# Patient Record
(demographics unavailable — no encounter records)

---

## 2024-10-20 NOTE — PHYSICAL EXAM
[General Appearance - Well Developed] : well developed [Normal Appearance] : normal appearance [Well Groomed] : well groomed [General Appearance - Well Nourished] : well nourished [No Deformities] : no deformities [General Appearance - In No Acute Distress] : no acute distress [Normal Conjunctiva] : the conjunctiva exhibited no abnormalities [Eyelids - No Xanthelasma] : the eyelids demonstrated no xanthelasmas [Normal Oral Mucosa] : normal oral mucosa [No Oral Pallor] : no oral pallor [No Oral Cyanosis] : no oral cyanosis [Normal Jugular Venous A Waves Present] : normal jugular venous A waves present [Normal Jugular Venous V Waves Present] : normal jugular venous V waves present [No Jugular Venous Celeste A Waves] : no jugular venous celeste A waves [Heart Rate And Rhythm] : heart rate and rhythm were normal [Heart Sounds] : normal S1 and S2 [Murmurs] : no murmurs present [Arterial Pulses Normal] : the arterial pulses were normal [Edema] : no peripheral edema present [Respiration, Rhythm And Depth] : normal respiratory rhythm and effort [Exaggerated Use Of Accessory Muscles For Inspiration] : no accessory muscle use [Auscultation Breath Sounds / Voice Sounds] : lungs were clear to auscultation bilaterally [Abdomen Soft] : soft [Abdomen Tenderness] : non-tender [Abdomen Mass (___ Cm)] : no abdominal mass palpated [Abnormal Walk] : normal gait [Gait - Sufficient For Exercise Testing] : the gait was sufficient for exercise testing [Nail Clubbing] : no clubbing of the fingernails [Cyanosis, Localized] : no localized cyanosis [Petechial Hemorrhages (___cm)] : no petechial hemorrhages [Oriented To Time, Place, And Person] : oriented to person, place, and time [] : no ischemic changes [Affect] : the affect was normal [Mood] : the mood was normal [No Anxiety] : not feeling anxious

## 2024-10-20 NOTE — PHYSICAL EXAM
[General Appearance - Well Developed] : well developed [Normal Appearance] : normal appearance [Well Groomed] : well groomed [General Appearance - Well Nourished] : well nourished [No Deformities] : no deformities [General Appearance - In No Acute Distress] : no acute distress [Normal Conjunctiva] : the conjunctiva exhibited no abnormalities [Eyelids - No Xanthelasma] : the eyelids demonstrated no xanthelasmas [Normal Oral Mucosa] : normal oral mucosa [No Oral Pallor] : no oral pallor [No Oral Cyanosis] : no oral cyanosis [Normal Jugular Venous A Waves Present] : normal jugular venous A waves present [Normal Jugular Venous V Waves Present] : normal jugular venous V waves present [No Jugular Venous Celeste A Waves] : no jugular venous celeste A waves [Heart Rate And Rhythm] : heart rate and rhythm were normal [Heart Sounds] : normal S1 and S2 [Murmurs] : no murmurs present [Arterial Pulses Normal] : the arterial pulses were normal [Edema] : no peripheral edema present [Respiration, Rhythm And Depth] : normal respiratory rhythm and effort [Exaggerated Use Of Accessory Muscles For Inspiration] : no accessory muscle use [Auscultation Breath Sounds / Voice Sounds] : lungs were clear to auscultation bilaterally [Abdomen Soft] : soft [Abdomen Tenderness] : non-tender [Abdomen Mass (___ Cm)] : no abdominal mass palpated [Abnormal Walk] : normal gait [Gait - Sufficient For Exercise Testing] : the gait was sufficient for exercise testing [Nail Clubbing] : no clubbing of the fingernails [Petechial Hemorrhages (___cm)] : no petechial hemorrhages [Cyanosis, Localized] : no localized cyanosis [] : no ischemic changes [Oriented To Time, Place, And Person] : oriented to person, place, and time [Affect] : the affect was normal [Mood] : the mood was normal [No Anxiety] : not feeling anxious

## 2024-10-20 NOTE — PHYSICAL EXAM
[General Appearance - Well Developed] : well developed [Normal Appearance] : normal appearance [Well Groomed] : well groomed [General Appearance - Well Nourished] : well nourished [No Deformities] : no deformities [General Appearance - In No Acute Distress] : no acute distress [Normal Conjunctiva] : the conjunctiva exhibited no abnormalities [Eyelids - No Xanthelasma] : the eyelids demonstrated no xanthelasmas [Normal Oral Mucosa] : normal oral mucosa [No Oral Pallor] : no oral pallor [No Oral Cyanosis] : no oral cyanosis [Normal Jugular Venous A Waves Present] : normal jugular venous A waves present [Normal Jugular Venous V Waves Present] : normal jugular venous V waves present [No Jugular Venous Celeste A Waves] : no jugular venous celeste A waves [Heart Rate And Rhythm] : heart rate and rhythm were normal [Heart Sounds] : normal S1 and S2 [Murmurs] : no murmurs present [Arterial Pulses Normal] : the arterial pulses were normal [Edema] : no peripheral edema present [Respiration, Rhythm And Depth] : normal respiratory rhythm and effort [Exaggerated Use Of Accessory Muscles For Inspiration] : no accessory muscle use [Auscultation Breath Sounds / Voice Sounds] : lungs were clear to auscultation bilaterally [Abdomen Soft] : soft [Abdomen Tenderness] : non-tender [Abdomen Mass (___ Cm)] : no abdominal mass palpated [Abnormal Walk] : normal gait [Gait - Sufficient For Exercise Testing] : the gait was sufficient for exercise testing [Nail Clubbing] : no clubbing of the fingernails [Cyanosis, Localized] : no localized cyanosis [Petechial Hemorrhages (___cm)] : no petechial hemorrhages [] : no ischemic changes [Oriented To Time, Place, And Person] : oriented to person, place, and time [Affect] : the affect was normal [Mood] : the mood was normal [No Anxiety] : not feeling anxious

## 2024-10-29 NOTE — REASON FOR VISIT
[FreeTextEntry1] : 65 year-old male with aortic aneurysm (4.0 cm), hyperglycemia, presents for followup.    Patient was last seen on 6/1/15 for evaluation of shortness of breath.  Patient reports no cardiac history. He is a friend recently  of MI in his 40s. Patient is concerned about his heart. He denies chest discomfort but reports shortness of breath with exertion. He denies palpitations. He reports no history of syncope.  Patient underwent a stress echo and completed 11 minutes of Keith protocol.  There were upsloping ST depressions on ECG but there was no echocardiographic evidence of ischemia.  Patient was noted to have mild aortic root dilatation (4.0 cm).

## 2024-10-29 NOTE — HISTORY OF PRESENT ILLNESS
[FreeTextEntry1] : 10/25/24 - Please refer to NP note below.  Pt is here for checkup. Pt reports SOB if he walks fast. Patient denies CP, palpitations, or lightheadedness. Patient denies h/o syncope. He has daily Qigong exercise for 30 minutes. He smokes 1/3 PPD.  Pt is not on meds.  Today's /82 P 69.  Exam unremarkable.  I advised patient to undergo an echocardiogram.  Patient should have a followup echo in 1 year.   6/1/15 - Patient reports no cardiac history. He is a friend recently  of MI in his 40s. Patient is concerned about his heart. He denies chest discomfort but reports shortness of breath with exertion. He denies palpitations. He reports no history of syncope.   Patient underwent a stress echo and completed 11 minutes of Keith protocol.  There were upsloping ST depressions on ECG but there was no echocardiographic evidence of ischemia.  Patient was noted to have mild aortic root dilatation (4.0 cm).

## 2025-02-20 NOTE — DISCUSSION/SUMMARY
[FreeTextEntry1] : Problem #1 ruptured infrarenal abdominal aortic aneurysm s/p aorto-aortic bypass graft concern for spillage due to post-operative colon ischemia and perforation necessitating a Cabezas's procedure no current evidence of graft infection, but suspected based on clinical course continue long-term antibiotics as per ID follow up with interventional radiology for drain removal follow up with Dr. Scooby Francisco for colectomy follow up

## 2025-02-20 NOTE — REASON FOR VISIT
[Spouse] : spouse [de-identified] : Aorto-aortic bypass for ruptured AAA [de-identified] : 12/31/24 [de-identified] : Poor appetite. No abdominal or back pain. Denies claudication or rest pain.

## 2025-03-24 NOTE — HISTORY OF PRESENT ILLNESS
[FreeTextEntry1] : doing well gaining wt  does have  back pain  no fever or other symptoms   wd healed   looks better  does have back pain  no decubitus in lumbar area

## 2025-03-24 NOTE — PHYSICAL EXAM
[General Appearance - Alert] : alert [General Appearance - In No Acute Distress] : in no acute distress [Outer Ear] : the ears and nose were normal in appearance [Oropharynx] : the oropharynx was normal with no thrush [] : no respiratory distress [Auscultation Breath Sounds / Voice Sounds] : lungs were clear to auscultation bilaterally

## 2025-03-24 NOTE — ASSESSMENT
[FreeTextEntry1] : S/p ruptured AA with; ischemic bowel and collections some concern about violation of graft a the time of surgery  complete months of ;IV ab and not o0n au;gmentum as major orgasm was [id number]   last ct in February wh9ch was reviewed and iZ discussed with surgery and vascular  drain out and no paln to drain residual collections seen on ct per surgery   continue  Augmentin  for  another moth \repeat CT in April

## 2025-06-26 NOTE — CONSULT LETTER
[Dear  ___] : Dear ~ABHIJEET, [Courtesy Letter:] : I had the pleasure of seeing your patient, [unfilled], in my office today. [Please see my note below.] : Please see my note below. [Consult Closing:] : Thank you very much for allowing me to participate in the care of this patient.  If you have any questions, please do not hesitate to contact me. [Sincerely,] : Sincerely, [FreeTextEntry2] : Dr. Scooby Francisco [FreeTextEntry3] : Shane Calles M.D., F.CATE.C.S., F.A.S.C.R.S. Chief Colorectal Clinical Services, [facility] [DrMikayla  ___] : Dr. OZUNA

## 2025-06-26 NOTE — PHYSICAL EXAM
[Normal Breath Sounds] : Normal breath sounds [Normal Heart Sounds] : normal heart sounds [Alert] : alert [Oriented to Person] : oriented to person [Oriented to Place] : oriented to place [Oriented to Time] : oriented to time [Calm] : calm [Abdomen Masses] : No abdominal masses [Abdomen Tenderness] : ~T No ~M abdominal tenderness [de-identified] : Healed midline wound.  Ostomy left upper quadrant normal. [de-identified] : WNL [de-identified] : WNL [de-identified] : DORITAL [de-identified] : WNL ROM [de-identified] : WNL

## 2025-06-26 NOTE — PHYSICAL EXAM
[Normal Breath Sounds] : Normal breath sounds [Normal Heart Sounds] : normal heart sounds [Alert] : alert [Oriented to Person] : oriented to person [Oriented to Place] : oriented to place [Oriented to Time] : oriented to time [Calm] : calm [Abdomen Masses] : No abdominal masses [Abdomen Tenderness] : ~T No ~M abdominal tenderness [de-identified] : Healed midline wound.  Ostomy left upper quadrant normal. [de-identified] : WNL [de-identified] : WNL [de-identified] : DORITAL [de-identified] : WNL ROM [de-identified] : WNL

## 2025-06-26 NOTE — CONSULT LETTER
[Dear  ___] : Dear ~ABHIJEET, [Courtesy Letter:] : I had the pleasure of seeing your patient, [unfilled], in my office today. [Please see my note below.] : Please see my note below. [Consult Closing:] : Thank you very much for allowing me to participate in the care of this patient.  If you have any questions, please do not hesitate to contact me. [Sincerely,] : Sincerely, [FreeTextEntry2] : Dr. Scooby Francisco [FreeTextEntry3] : Shane Calles M.D., F.CATE.C.S., F.A.S.C.R.S. Chief Colorectal Clinical Services, [facility] [DrMikayla  ___] : Dr. OZUNA

## 2025-06-26 NOTE — HISTORY OF PRESENT ILLNESS
[FreeTextEntry1] : Bola is a 66 y/o male here for a consultation visit, colostomy reversal.   Hospitalized on 12/31/24 - 2/26/25 due to ruptured AAA.   s/p Open repair of ruptured abdominal aortic aneurysm with 14mm dacron tube graft. Temporary abdominal closure with negative pressure wound vac therapy on 12/31/24 by Dr. Emmanuel Sweeney   s/p Damage control left hemicolectomy. Mobilization of splenic flexure. Bridging abdominal wall mesh placement for mesh mediated fascial traction. ABThera VAC placement on 1/3/25 by Dr. Scooby Francisco due to Ruptured abdominal aortic aneurysm and Acute mesenteric ischemia. Pathology - 1. Left colon, resection - Colon with transmural necrosis, acute inflammation, fibrinopurulent exudate, and acute serositis, consistent with ischemic colitis - Mesenteric thrombosis present  s/p Reopening of prior laparotomy, resection of transverse colon, ABThera VAC placement on 1/5/25 by Dr. Katerina De Jesus due to Ischemic colitis. Pathology - Transverse colon, resection: - Portion of colon with areas of transmural necrosis, acute inflammation; findings consistent with ischemic colitis - Acute serositis - Proximal resection margin shows ischemic changes and acute serositis - Distal resection margin appears viable - Multiple benign lymph nodes  s/p End transverse colostomy creation. Removal of the polypropylene mesh from the abdominal wall. Abdominal wall closure on 1/7/25 by Dr. Scooby Francisco due to Acute mesenteric ischemia.  Today pt reports no pain. Surgical incisions have healed. Denies nausea and vomiting. Denies fever and chills. Empties bag 1-2 times daily, output is soft, slight amount of mucus from rectum, sometimes has urge to have BM through rectum and would get leakage of yellow liquid, stoma is normal. Good appetite. Not taking anticoagulants.

## 2025-06-26 NOTE — ASSESSMENT
[FreeTextEntry1] : I have seen and evaluated the patient, and I have corroborated all nursing input into this note.  The patient is status post surgery for a ruptured aortic aneurysm.  He had an open abdomen.  He required a left colectomy for ischemia and now has a right transverse colostomy and a closed rectosigmoid junction.  He had multiple abdominal procedures and had a temporary bridging mesh that was eventually removed.  Currently he feels well and is interested in closure of his colostomy.  Since he has never had a colonoscopy I recommended that examination prior to colostomy closure.  Indications, risks, benefits, alternatives reviewed including but not limited to bleeding and perforation.  The patient has a cardiologist who he has not seen since his surgery.  I recommended follow-up with his cardiologist prior to the colonoscopy to ensure that there are no unaddressed issues.  After the colonoscopy colostomy closure can be arranged.  Unfortunately, open surgery will be needed because both the open abdominal aortic aneurysm repair and the open abdomen often result in extensive adhesions.  Indications risks, benefits, alternatives reviewed including but not limited to bleeding, infection, anastomotic leak, and alteration of bowel function.  The patient's family member was present for the conversation and all questions were answered.

## 2025-06-30 NOTE — END OF VISIT
[Time Spent: ___ minutes] : I have spent [unfilled] minutes of time on the encounter which excludes teaching and separately reported services. O-L Flap Text: The defect edges were debeveled with a #15 scalpel blade.  Given the location of the defect, shape of the defect and the proximity to free margins an O-L flap was deemed most appropriate.  Using a sterile surgical marker, an appropriate advancement flap was drawn incorporating the defect and placing the expected incisions within the relaxed skin tension lines where possible.    The area thus outlined was incised deep to adipose tissue with a #15 scalpel blade.  The skin margins were undermined to an appropriate distance in all directions utilizing iris scissors.

## 2025-07-06 NOTE — PHYSICAL EXAM
[Respiratory Effort] : normal respiratory effort [Normal Rate and Rhythm] : normal rate and rhythm [Ankle Swelling (On Exam)] : present [Ankle Swelling Bilaterally] : bilaterally  [Ankle Swelling On The Right] : mild [Varicose Veins Of Lower Extremities] : not present [] : not present [Abdomen Tenderness] : ~T ~M No abdominal tenderness [Skin Ulcer] : no ulcer [Alert] : alert [Oriented to Person] : oriented to person [Oriented to Place] : oriented to place [Oriented to Time] : oriented to time [Calm] : calm [de-identified] : appears stated age [de-identified] : normocephalic, atraumatic [de-identified] : supple [FreeTextEntry1] : well healed midline laparotomy

## 2025-07-06 NOTE — PHYSICAL EXAM
[Respiratory Effort] : normal respiratory effort [Normal Rate and Rhythm] : normal rate and rhythm [Ankle Swelling (On Exam)] : present [Ankle Swelling Bilaterally] : bilaterally  [Ankle Swelling On The Right] : mild [Varicose Veins Of Lower Extremities] : not present [] : not present [Abdomen Tenderness] : ~T ~M No abdominal tenderness [Skin Ulcer] : no ulcer [Alert] : alert [Oriented to Person] : oriented to person [Oriented to Place] : oriented to place [Oriented to Time] : oriented to time [Calm] : calm [de-identified] : appears stated age [de-identified] : normocephalic, atraumatic [de-identified] : supple [FreeTextEntry1] : well healed midline laparotomy

## 2025-07-06 NOTE — HISTORY OF PRESENT ILLNESS
[FreeTextEntry1] : 65-year-old man s/p aorto-aortic bypass for ruptured AAA on 12/31/24.  02/19/25 - Poor appetite. No abdominal or back pain. Denies claudication or rest pain. 05/19/25 - CTA Abd/Pelvis: Partially visualized aortic dissection extending from inferior descending thoracic aorta to proximal abdominal aorta with patent mesenteric and renal vessels. Substantial resolution periaortic collection with small residual collection. Status post [id number] aortic bypass graft repair ruptured aortic aneurysm without evidence of leak [de-identified] : States he is feeling well. Denies chest, abdominal, or back pain. States his appetite has returned. He is having normal bowel movements.

## 2025-07-06 NOTE — HISTORY OF PRESENT ILLNESS
[FreeTextEntry1] : 65-year-old man s/p aorto-aortic bypass for ruptured AAA on 12/31/24.  02/19/25 - Poor appetite. No abdominal or back pain. Denies claudication or rest pain. 05/19/25 - CTA Abd/Pelvis: Partially visualized aortic dissection extending from inferior descending thoracic aorta to proximal abdominal aorta with patent mesenteric and renal vessels. Substantial resolution periaortic collection with small residual collection. Status post [id number] aortic bypass graft repair ruptured aortic aneurysm without evidence of leak [de-identified] : States he is feeling well. Denies chest, abdominal, or back pain. States his appetite has returned. He is having normal bowel movements.

## 2025-07-06 NOTE — ASSESSMENT
[FreeTextEntry1] : Problem #1 ruptured abdominal aortic aneurysm s/p repair with prolonged hospitalization patient back to baseline recent CT demonstrated aortic dissection pt asymptomatic will obtain CTA chest, abdomen, and pelvis to assess full extent of dissection. discussed with patient. follow up as scheduled

## 2025-07-07 NOTE — REASON FOR VISIT
[FreeTextEntry1] : 65 year-old male with aortic aneurysm (4.4 cm), ruptured AAA s/p aorto-aortic bypass on 12/31/24, complicated by acute mesenteric ischemia requiring ex-lap, left hemicolectomy on 1/3/25, s/p end transverse colostomy creation and abdominal wall closure on 1/7/25, hyperglycemia, presents for followup.    Patient was last seen on 10/25/24 - > Pt reports SOB if he walks fast. Patient denies CP, palpitations, or lightheadedness. Patient denies h/o syncope. He has daily Qigong exercise for 30 minutes. He smokes 1/3 PPD. Pt is not on meds. Today's /82 P 69. Exam unremarkable. I advised patient to undergo an echocardiogram.  Patient underwent an echocardiogram and it showed normal LV function, mild aortic root dilatation (4.4 cm), mild dilatation of the ascending aorta (3.9 cm), without significant valvular pathology.  Patient should have a followup echo in 1 year.  CTA A/P 5/22/25 - Partially visualized aortic dissection extending from inferior descending thoracic aorta to proximal abdominal aorta with patent mesenteric and renal vessels.  Patient underwent a stress echo 6/1/15 and completed 11 minutes of Keith protocol.  There were upsloping ST depressions on ECG but there was no echocardiographic evidence of ischemia.  Patient was noted to have mild aortic root dilatation (4.0 cm).

## 2025-07-07 NOTE — HISTORY OF PRESENT ILLNESS
[FreeTextEntry1] : 25-  Please refer to NP note below. Pt needs a cardiac clearance prior to colonoscopy and colostomy closure. Patient denies CP, SOB, palpitations, or lightheadedness. Patient denies h/o syncope. Patient denies any bleeding issues. He walks daily for 15 minutes without symptoms. He is able to walk 3 flights of stairs. He quitted smoking since surgery.  Pt is on ASA 81mg PRN, Metoprolol Tartrate 50mg.  His home SBP is around 130. Today's /80P 60.  Exam unremarkable.  ECG showed no ischemic changes.  Patient underwent an echocardiogram and it showed normal LV function, mild aortic root dilatation (4.4 cm), mild dilatation of the ascending aorta (3.9 cm), without significant valvular pathology.  Patient underwent a treadmill stress test and completed 7 minutes of Keith protocol.  There was no ECG evidence of ischemia.  Following treadmill stress, there was no echocardiographic evidence of ischemia.  Patient is cleared for colonoscopy, surgery and anesthesia.  Patient may hold ASA 81 mg for 7 days prior to surgery/procedure.  (1) Cardiac clearance prior to colonoscopy and colostomy closure - Patient has been stable s/p ruptured AAA requiring open repair.  He also has a descending aortic dissection.  Patient denies exertional symptoms.  He has a normal ECG.  Patient underwent an echocardiogram and it showed normal LV function, mild aortic root dilatation (4.4 cm), mild dilatation of the ascending aorta (3.9 cm), without significant valvular pathology.  Patient underwent a treadmill stress test and completed 7 minutes of Keith protocol.  There was no ECG evidence of ischemia.  Following treadmill stress, there was no echocardiographic evidence of ischemia.  Patient is cleared for colonoscopy, surgery and anesthesia.  Patient may hold ASA 81 mg for 7 days prior to surgery/procedure.  (2) Ruptured AAA requiring open repair, descending aortic dissection - I advised patient to continue Metoprolol for BP and HR control.  I advised patient to followup with vascular surgery.  (3) Followup - 3 months.   10/25/24 - Please refer to NP note below.  Pt is here for checkup. Pt reports SOB if he walks fast. Patient denies CP, palpitations, or lightheadedness. Patient denies h/o syncope. He has daily Qigong exercise for 30 minutes. He smokes 1/3 PPD.  Pt is not on meds.  Today's /82 P 69.  Exam unremarkable.  I advised patient to undergo an echocardiogram.  Patient should have a followup echo in 1 year.   6/1/15 - Patient reports no cardiac history. He is a friend recently  of MI in his 40s. Patient is concerned about his heart. He denies chest discomfort but reports shortness of breath with exertion. He denies palpitations. He reports no history of syncope.   Patient underwent a stress echo and completed 11 minutes of Keith protocol.  There were upsloping ST depressions on ECG but there was no echocardiographic evidence of ischemia.  Patient was noted to have mild aortic root dilatation (4.0 cm).

## 2025-07-07 NOTE — HISTORY OF PRESENT ILLNESS
[FreeTextEntry1] : 25-  Please refer to NP note below. Pt needs a cardiac clearance prior to colonoscopy and colostomy closure. Patient denies CP, SOB, palpitations, or lightheadedness. Patient denies h/o syncope. Patient denies any bleeding issues. He walks daily for 15 minutes without symptoms. He is able to walk 3 flights of stairs. He quitted smoking since surgery.  Pt is on ASA 81mg PRN, Metoprolol Tartrate 50mg.  His home SBP is around 130. Today's /80P 60.  Exam unremarkable.  ECG showed no ischemic changes.  Patient underwent an echocardiogram and it showed normal LV function, mild aortic root dilatation (4.4 cm), mild dilatation of the ascending aorta (3.9 cm), without significant valvular pathology.  Patient underwent a treadmill stress test and completed 7 minutes of Keith protocol.  There was no ECG evidence of ischemia.  Following treadmill stress, there was no echocardiographic evidence of ischemia.  Patient is cleared for colonoscopy, surgery and anesthesia.  Patient may hold ASA 81 mg for 7 days prior to surgery/procedure.  (1) Cardiac clearance prior to colonoscopy and colostomy closure - Patient has been stable s/p ruptured AAA requiring open repair.  He also has a descending aortic dissection.  Patient denies exertional symptoms.  He has a normal ECG.  Patient underwent an echocardiogram and it showed normal LV function, mild aortic root dilatation (4.4 cm), mild dilatation of the ascending aorta (3.9 cm), without significant valvular pathology.  Patient underwent a treadmill stress test and completed 7 minutes of Keith protocol.  There was no ECG evidence of ischemia.  Following treadmill stress, there was no echocardiographic evidence of ischemia.  Patient is cleared for colonoscopy, surgery and anesthesia.  Patient may hold ASA 81 mg for 7 days prior to surgery/procedure.  (2) Ruptured AAA requiring open repair, descending aortic dissection - I advised patient to continue Metoprolol for BP and HR control.  I advised patient to followup with vascular surgery.  (3) Followup - 3 months.   10/25/24 - Please refer to NP note below.  Pt is here for checkup. Pt reports SOB if he walks fast. Patient denies CP, palpitations, or lightheadedness. Patient denies h/o syncope. He has daily Qigong exercise for 30 minutes. He smokes 1/3 PPD.  Pt is not on meds.  Today's /82 P 69.  Exam unremarkable.  I advised patient to undergo an echocardiogram.  Patient should have a followup echo in 1 year.   6/1/15 - Patient reports no cardiac history. He is a friend recently  of MI in his 40s. Patient is concerned about his heart. He denies chest discomfort but reports shortness of breath with exertion. He denies palpitations. He reports no history of syncope.   Patient underwent a stress echo and completed 11 minutes of Keith protocol.  There were upsloping ST depressions on ECG but there was no echocardiographic evidence of ischemia.  Patient was noted to have mild aortic root dilatation (4.0 cm).                 none